# Patient Record
Sex: FEMALE | Race: WHITE | Employment: OTHER | URBAN - METROPOLITAN AREA
[De-identification: names, ages, dates, MRNs, and addresses within clinical notes are randomized per-mention and may not be internally consistent; named-entity substitution may affect disease eponyms.]

---

## 2017-02-21 PROCEDURE — 87086 URINE CULTURE/COLONY COUNT: CPT | Performed by: INTERNAL MEDICINE

## 2017-02-21 PROCEDURE — 87077 CULTURE AEROBIC IDENTIFY: CPT | Performed by: INTERNAL MEDICINE

## 2017-02-21 PROCEDURE — 87186 SC STD MICRODIL/AGAR DIL: CPT | Performed by: INTERNAL MEDICINE

## 2017-02-21 PROCEDURE — 81001 URINALYSIS AUTO W/SCOPE: CPT | Performed by: INTERNAL MEDICINE

## 2017-02-27 PROCEDURE — 87086 URINE CULTURE/COLONY COUNT: CPT | Performed by: INTERNAL MEDICINE

## 2017-02-27 PROCEDURE — 81001 URINALYSIS AUTO W/SCOPE: CPT | Performed by: INTERNAL MEDICINE

## 2017-03-10 PROCEDURE — 84443 ASSAY THYROID STIM HORMONE: CPT | Performed by: INTERNAL MEDICINE

## 2017-03-10 PROCEDURE — 80048 BASIC METABOLIC PNL TOTAL CA: CPT | Performed by: INTERNAL MEDICINE

## 2017-03-10 PROCEDURE — 82306 VITAMIN D 25 HYDROXY: CPT | Performed by: INTERNAL MEDICINE

## 2017-03-10 PROCEDURE — 36415 COLL VENOUS BLD VENIPUNCTURE: CPT | Performed by: INTERNAL MEDICINE

## 2017-04-13 ENCOUNTER — HOSPITAL ENCOUNTER (EMERGENCY)
Age: 67
Discharge: HOME | End: 2017-04-13
Attending: EMERGENCY MEDICINE
Payer: MEDICARE

## 2017-04-13 ENCOUNTER — APPOINTMENT (OUTPATIENT)
Dept: CT IMAGING | Age: 67
End: 2017-04-13
Attending: EMERGENCY MEDICINE
Payer: MEDICARE

## 2017-04-13 VITALS
HEART RATE: 70 BPM | HEIGHT: 64 IN | BODY MASS INDEX: 27.31 KG/M2 | SYSTOLIC BLOOD PRESSURE: 158 MMHG | RESPIRATION RATE: 16 BRPM | TEMPERATURE: 98 F | WEIGHT: 160 LBS | OXYGEN SATURATION: 98 % | DIASTOLIC BLOOD PRESSURE: 67 MMHG

## 2017-04-13 DIAGNOSIS — R42 VERTIGO: Primary | ICD-10-CM

## 2017-04-13 DIAGNOSIS — E86.0 DEHYDRATION: ICD-10-CM

## 2017-04-13 PROCEDURE — 93005 ELECTROCARDIOGRAM TRACING: CPT

## 2017-04-13 PROCEDURE — 70450 CT HEAD/BRAIN W/O DYE: CPT

## 2017-04-13 PROCEDURE — 84484 ASSAY OF TROPONIN QUANT: CPT | Performed by: EMERGENCY MEDICINE

## 2017-04-13 PROCEDURE — 96374 THER/PROPH/DIAG INJ IV PUSH: CPT

## 2017-04-13 PROCEDURE — 84132 ASSAY OF SERUM POTASSIUM: CPT | Performed by: EMERGENCY MEDICINE

## 2017-04-13 PROCEDURE — 85025 COMPLETE CBC W/AUTO DIFF WBC: CPT | Performed by: EMERGENCY MEDICINE

## 2017-04-13 PROCEDURE — 99285 EMERGENCY DEPT VISIT HI MDM: CPT

## 2017-04-13 PROCEDURE — 84460 ALANINE AMINO (ALT) (SGPT): CPT | Performed by: EMERGENCY MEDICINE

## 2017-04-13 PROCEDURE — 84450 TRANSFERASE (AST) (SGOT): CPT | Performed by: EMERGENCY MEDICINE

## 2017-04-13 PROCEDURE — 93010 ELECTROCARDIOGRAM REPORT: CPT

## 2017-04-13 PROCEDURE — 80053 COMPREHEN METABOLIC PANEL: CPT | Performed by: EMERGENCY MEDICINE

## 2017-04-13 PROCEDURE — 96361 HYDRATE IV INFUSION ADD-ON: CPT

## 2017-04-13 RX ORDER — ONDANSETRON 2 MG/ML
4 INJECTION INTRAMUSCULAR; INTRAVENOUS ONCE
Status: COMPLETED | OUTPATIENT
Start: 2017-04-13 | End: 2017-04-13

## 2017-04-13 RX ORDER — MECLIZINE HYDROCHLORIDE 25 MG/1
25 TABLET ORAL 3 TIMES DAILY PRN
Qty: 20 TABLET | Refills: 0 | Status: SHIPPED | OUTPATIENT
Start: 2017-04-13 | End: 2017-09-18 | Stop reason: ALTCHOICE

## 2017-04-13 RX ORDER — POTASSIUM CHLORIDE 20 MEQ/1
20 TABLET, EXTENDED RELEASE ORAL ONCE
Status: COMPLETED | OUTPATIENT
Start: 2017-04-13 | End: 2017-04-13

## 2017-04-13 RX ORDER — MECLIZINE HYDROCHLORIDE 25 MG/1
25 TABLET ORAL ONCE
Status: COMPLETED | OUTPATIENT
Start: 2017-04-13 | End: 2017-04-13

## 2017-04-13 RX ORDER — ONDANSETRON 4 MG/1
4 TABLET, ORALLY DISINTEGRATING ORAL EVERY 4 HOURS PRN
Qty: 20 TABLET | Refills: 0 | Status: SHIPPED | OUTPATIENT
Start: 2017-04-13 | End: 2017-09-18 | Stop reason: ALTCHOICE

## 2017-04-14 NOTE — ED INITIAL ASSESSMENT (HPI)
Pt c/o nausea for one week and dizziness. Pt has appt with GI march 28th , c/o chronic abd pain with blood in her stool. pmd dx patient with hemmorhoids. Pt is scheduled for colonscopy. Pt denies fevers. C/o chills and hot and cold.
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
## 2017-04-14 NOTE — ED PROVIDER NOTES
Patient Seen in: THE [facility] Emergency Department In [place]    History   Patient presents with:  Nausea/Vomiting/Diarrhea (gastrointestinal)  Dizziness (neurologic)    Stated Complaint: nausea, dizzy    HPI    This is a 43-year-old female who arrives here (1 mg total) by mouth 2 (two) times daily. ATORVASTATIN CALCIUM 20 MG Oral Tab,  TAKE 1 TABLET BY MOUTH ONE TIME A DAY    celecoxib 200 MG Oral Cap,  One tab per day . Take after meals.    Fluticasone Propionate (FLONASE) 50 MCG/ACT Nasal Suspension,  Two above.    PSFH elements reviewed from today and agreed except as otherwise stated in HPI.     Physical Exam       ED Triage Vitals   BP 04/13/17 1936 167/87 mmHg   Pulse 04/13/17 1936 73   Resp 04/13/17 1936 18   Temp 04/13/17 1936 98.1 °F (36.7 °C)   Temp POTASSIUM - Abnormal; Notable for the following:     Potassium 3.5 (*)     All other components within normal limits   CBC W/ DIFFERENTIAL - Abnormal; Notable for the following:     Monocyte Absolute 0.93 (*)     All other components within normal limits MRI and further imaging studies but she seem to gotten better with the fluids, meclizine she wants to go home.   S she states that she has no blood in her stools now she is states is all brown stool she has no abdominal pain nausea vomiting at this present

## 2017-07-08 ENCOUNTER — APPOINTMENT (OUTPATIENT)
Dept: GENERAL RADIOLOGY | Age: 67
End: 2017-07-08
Attending: EMERGENCY MEDICINE
Payer: COMMERCIAL

## 2017-07-08 ENCOUNTER — HOSPITAL ENCOUNTER (EMERGENCY)
Age: 67
Discharge: HOME | End: 2017-07-08
Attending: EMERGENCY MEDICINE
Payer: COMMERCIAL

## 2017-07-08 VITALS
WEIGHT: 157 LBS | SYSTOLIC BLOOD PRESSURE: 145 MMHG | DIASTOLIC BLOOD PRESSURE: 78 MMHG | BODY MASS INDEX: 26.8 KG/M2 | OXYGEN SATURATION: 95 % | TEMPERATURE: 99 F | RESPIRATION RATE: 20 BRPM | HEIGHT: 64 IN | HEART RATE: 79 BPM

## 2017-07-08 DIAGNOSIS — S13.4XXA WHIPLASH, INITIAL ENCOUNTER: ICD-10-CM

## 2017-07-08 DIAGNOSIS — M62.838 TRAPEZIUS MUSCLE SPASM: ICD-10-CM
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
## 2017-07-08 PROCEDURE — 72040 X-RAY EXAM NECK SPINE 2-3 VW: CPT | Performed by: EMERGENCY MEDICINE

## 2017-07-08 PROCEDURE — 99284 EMERGENCY DEPT VISIT MOD MDM: CPT

## 2017-07-08 PROCEDURE — 73030 X-RAY EXAM OF SHOULDER: CPT | Performed by: EMERGENCY MEDICINE

## 2017-07-08 RX ORDER — IBUPROFEN 600 MG/1
600 TABLET ORAL ONCE
Status: COMPLETED | OUTPATIENT
Start: 2017-07-08 | End: 2017-07-08

## 2017-07-08 RX ORDER — CYCLOBENZAPRINE HCL 10 MG
10 TABLET ORAL 3 TIMES DAILY PRN
Qty: 20 TABLET | Refills: 0 | Status: SHIPPED | OUTPATIENT
Start: 2017-07-08 | End: 2017-07-15
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
## 2017-08-07 ENCOUNTER — CHARTING TRANS (OUTPATIENT)
Dept: OTHER | Age: 67
End: 2017-08-07

## 2018-01-10 PROBLEM — M54.50 CHRONIC BILATERAL LOW BACK PAIN WITHOUT SCIATICA: Status: ACTIVE | Noted: 2018-01-10

## 2018-01-10 PROBLEM — M54.6 CHRONIC BILATERAL THORACIC BACK PAIN: Status: ACTIVE | Noted: 2018-01-10

## 2018-01-10 PROBLEM — G89.29 CHRONIC BILATERAL LOW BACK PAIN WITHOUT SCIATICA: Status: ACTIVE | Noted: 2018-01-10

## 2018-01-10 PROBLEM — G89.29 CHRONIC BILATERAL THORACIC BACK PAIN: Status: ACTIVE | Noted: 2018-01-10

## 2018-01-12 ENCOUNTER — CHARTING TRANS (OUTPATIENT)
Dept: OTHER | Age: 68
End: 2018-01-12

## 2018-01-12 ENCOUNTER — LAB SERVICES (OUTPATIENT)
Dept: OTHER | Age: 68
End: 2018-01-12

## 2018-01-12 LAB
APPEARANCE: CLEAR
BILIRUBIN: NORMAL
COLOR: YELLOW
GLUCOSE U: NORMAL
KETONES: NORMAL
LEUKOCYTES: NORMAL
NITRITE: NORMAL
OCCULT BLOOD: NORMAL
PH: 5
PROTEIN: NORMAL
URINE SPEC GRAVITY: 1.02
UROBILINOGEN: 0.2

## 2018-01-17 LAB — BACTERIA UR CULT: NORMAL

## 2018-01-18 PROCEDURE — 81003 URINALYSIS AUTO W/O SCOPE: CPT | Performed by: INTERNAL MEDICINE

## 2018-01-23 PROBLEM — I70.0 AORTO-ILIAC ATHEROSCLEROSIS (HCC): Status: ACTIVE | Noted: 2018-01-23

## 2018-01-23 PROBLEM — I70.8 AORTO-ILIAC ATHEROSCLEROSIS (HCC): Status: ACTIVE | Noted: 2018-01-23

## 2018-02-21 PROCEDURE — 81001 URINALYSIS AUTO W/SCOPE: CPT | Performed by: INTERNAL MEDICINE

## 2018-03-02 PROBLEM — M47.816 LUMBAR FACET ARTHROPATHY: Status: ACTIVE | Noted: 2018-03-02

## 2018-07-09 PROCEDURE — 87086 URINE CULTURE/COLONY COUNT: CPT | Performed by: INTERNAL MEDICINE

## 2018-07-09 PROCEDURE — 87077 CULTURE AEROBIC IDENTIFY: CPT | Performed by: INTERNAL MEDICINE

## 2018-07-09 PROCEDURE — 87186 SC STD MICRODIL/AGAR DIL: CPT | Performed by: INTERNAL MEDICINE

## 2018-10-22 PROCEDURE — 87086 URINE CULTURE/COLONY COUNT: CPT | Performed by: INTERNAL MEDICINE

## 2018-11-02 VITALS
RESPIRATION RATE: 20 BRPM | TEMPERATURE: 98.6 F | WEIGHT: 161 LBS | DIASTOLIC BLOOD PRESSURE: 90 MMHG | HEART RATE: 88 BPM | SYSTOLIC BLOOD PRESSURE: 122 MMHG | HEIGHT: 64 IN | BODY MASS INDEX: 27.49 KG/M2

## 2018-11-03 VITALS
HEIGHT: 64 IN | WEIGHT: 156 LBS | RESPIRATION RATE: 16 BRPM | BODY MASS INDEX: 26.63 KG/M2 | HEART RATE: 72 BPM | TEMPERATURE: 98.2 F

## 2019-03-04 PROBLEM — M79.7 FIBROMYALGIA: Status: ACTIVE | Noted: 2019-03-04

## 2019-03-06 PROBLEM — M79.10 MYALGIA: Status: ACTIVE | Noted: 2019-03-06

## 2019-11-19 PROBLEM — IMO0001 ATROPHY, CORTICAL: Status: ACTIVE | Noted: 2019-11-19

## 2020-03-23 PROBLEM — IMO0001 ATROPHY, CORTICAL: Status: RESOLVED | Noted: 2019-11-19 | Resolved: 2020-03-23

## 2020-03-23 PROBLEM — N18.30 CKD (CHRONIC KIDNEY DISEASE) STAGE 3, GFR 30-59 ML/MIN (HCC): Status: ACTIVE | Noted: 2020-03-23

## 2020-03-23 PROBLEM — M79.10 MYALGIA: Status: RESOLVED | Noted: 2019-03-06 | Resolved: 2020-03-23

## 2020-04-20 PROBLEM — D84.9 IMMUNOSUPPRESSED STATUS (HCC): Status: ACTIVE | Noted: 2020-04-20

## 2020-12-29 PROCEDURE — 88304 TISSUE EXAM BY PATHOLOGIST: CPT | Performed by: OPHTHALMOLOGY

## 2021-03-04 ENCOUNTER — APPOINTMENT (OUTPATIENT)
Dept: CT IMAGING | Facility: HOSPITAL | Age: 71
DRG: 177 | End: 2021-03-04
Attending: EMERGENCY MEDICINE
Payer: MEDICARE

## 2021-03-04 ENCOUNTER — APPOINTMENT (OUTPATIENT)
Dept: GENERAL RADIOLOGY | Facility: HOSPITAL | Age: 71
DRG: 177 | End: 2021-03-04
Attending: EMERGENCY MEDICINE
Payer: MEDICARE

## 2021-03-04 ENCOUNTER — HOSPITAL ENCOUNTER (INPATIENT)
Facility: HOSPITAL | Age: 71
LOS: 4 days | Discharge: HOME | DRG: 177 | End: 2021-03-08
Attending: EMERGENCY MEDICINE | Admitting: HOSPITALIST
Payer: MEDICARE

## 2021-03-04 DIAGNOSIS — R09.02 HYPOXIA: ICD-10-CM

## 2021-03-04 DIAGNOSIS — U07.1 COVID-19: Primary | ICD-10-CM

## 2021-03-04 LAB
ALBUMIN SERPL-MCNC: 3.4 G/DL (ref 3.4–5)
ALBUMIN/GLOB SERPL: 0.9 {RATIO} (ref 1–2)
ALP LIVER SERPL-CCNC: 81 U/L
ALT SERPL-CCNC: 19 U/L
ANION GAP SERPL CALC-SCNC: 5 MMOL/L (ref 0–18)
ANTIBODY SCREEN: NEGATIVE
AST SERPL-CCNC: 51 U/L (ref 15–37)
ATRIAL RATE: 50 BPM
BASOPHILS # BLD AUTO: 0.01 X10(3) UL (ref 0–0.2)
BASOPHILS NFR BLD AUTO: 0.2 %
BILIRUB SERPL-MCNC: 0.6 MG/DL (ref 0.1–2)
BUN BLD-MCNC: 30 MG/DL (ref 7–18)
BUN/CREAT SERPL: 25.2 (ref 10–20)
CALCIUM BLD-MCNC: 9.8 MG/DL (ref 8.5–10.1)
CHLORIDE SERPL-SCNC: 102 MMOL/L (ref 98–112)
CK SERPL-CCNC: 94 U/L
CO2 SERPL-SCNC: 27 MMOL/L (ref 21–32)
CREAT BLD-MCNC: 1.19 MG/DL
CRP SERPL-MCNC: 1.62 MG/DL (ref ?–0.3)
D-DIMER: 2.47 UG/ML FEU (ref ?–0.72)
DEPRECATED HBV CORE AB SER IA-ACNC: 472.3 NG/ML
DEPRECATED RDW RBC AUTO: 45.1 FL (ref 35.1–46.3)
EOSINOPHIL # BLD AUTO: 0 X10(3) UL (ref 0–0.7)
EOSINOPHIL NFR BLD AUTO: 0 %
ERYTHROCYTE [DISTWIDTH] IN BLOOD BY AUTOMATED COUNT: 13.3 % (ref 11–15)
GLOBULIN PLAS-MCNC: 4 G/DL (ref 2.8–4.4)
GLUCOSE BLD-MCNC: 99 MG/DL (ref 70–99)
HCT VFR BLD AUTO: 44.8 %
HGB BLD-MCNC: 15 G/DL
IMM GRANULOCYTES # BLD AUTO: 0.03 X10(3) UL (ref 0–1)
IMM GRANULOCYTES NFR BLD: 0.6 %
LDH SERPL L TO P-CCNC: 530 U/L
LYMPHOCYTES # BLD AUTO: 0.83 X10(3) UL (ref 1–4)
LYMPHOCYTES NFR BLD AUTO: 17.4 %
M PROTEIN MFR SERPL ELPH: 7.4 G/DL (ref 6.4–8.2)
MCH RBC QN AUTO: 31.1 PG (ref 26–34)
MCHC RBC AUTO-ENTMCNC: 33.5 G/DL (ref 31–37)
MCV RBC AUTO: 92.9 FL
MONOCYTES # BLD AUTO: 0.66 X10(3) UL (ref 0.1–1)
MONOCYTES NFR BLD AUTO: 13.9 %
NEUTROPHILS # BLD AUTO: 3.23 X10 (3) UL (ref 1.5–7.7)
NEUTROPHILS # BLD AUTO: 3.23 X10(3) UL (ref 1.5–7.7)
NEUTROPHILS NFR BLD AUTO: 67.9 %
NT-PROBNP SERPL-MCNC: 43 PG/ML (ref ?–125)
OSMOLALITY SERPL CALC.SUM OF ELEC: 284 MOSM/KG (ref 275–295)
P AXIS: 41 DEGREES
P-R INTERVAL: 174 MS
PLATELET # BLD AUTO: 109 10(3)UL (ref 150–450)
POTASSIUM SERPL-SCNC: 5 MMOL/L (ref 3.5–5.1)
PROCALCITONIN SERPL-MCNC: <0.05 NG/ML (ref ?–0.16)
Q-T INTERVAL: 442 MS
QRS DURATION: 92 MS
QTC CALCULATION (BEZET): 402 MS
R AXIS: -12 DEGREES
RBC # BLD AUTO: 4.82 X10(6)UL
RH BLOOD TYPE: POSITIVE
SARS-COV-2 RNA RESP QL NAA+PROBE: DETECTED
SODIUM SERPL-SCNC: 134 MMOL/L (ref 136–145)
T AXIS: 36 DEGREES
TROPONIN I SERPL-MCNC: <0.045 NG/ML (ref ?–0.04)
VENTRICULAR RATE: 50 BPM
WBC # BLD AUTO: 4.8 X10(3) UL (ref 4–11)

## 2021-03-04 PROCEDURE — 86140 C-REACTIVE PROTEIN: CPT | Performed by: EMERGENCY MEDICINE

## 2021-03-04 PROCEDURE — 80053 COMPREHEN METABOLIC PANEL: CPT | Performed by: EMERGENCY MEDICINE

## 2021-03-04 PROCEDURE — 3E0333Z INTRODUCTION OF ANTI-INFLAMMATORY INTO PERIPHERAL VEIN, PERCUTANEOUS APPROACH: ICD-10-PCS | Performed by: INTERNAL MEDICINE

## 2021-03-04 PROCEDURE — 83615 LACTATE (LD) (LDH) ENZYME: CPT | Performed by: EMERGENCY MEDICINE

## 2021-03-04 PROCEDURE — 36415 COLL VENOUS BLD VENIPUNCTURE: CPT

## 2021-03-04 PROCEDURE — 87040 BLOOD CULTURE FOR BACTERIA: CPT | Performed by: EMERGENCY MEDICINE

## 2021-03-04 PROCEDURE — 85025 COMPLETE CBC W/AUTO DIFF WBC: CPT | Performed by: EMERGENCY MEDICINE

## 2021-03-04 PROCEDURE — 86850 RBC ANTIBODY SCREEN: CPT | Performed by: INTERNAL MEDICINE

## 2021-03-04 PROCEDURE — 99285 EMERGENCY DEPT VISIT HI MDM: CPT

## 2021-03-04 PROCEDURE — 71275 CT ANGIOGRAPHY CHEST: CPT | Performed by: EMERGENCY MEDICINE

## 2021-03-04 PROCEDURE — 85379 FIBRIN DEGRADATION QUANT: CPT | Performed by: EMERGENCY MEDICINE

## 2021-03-04 PROCEDURE — XW033E5 INTRODUCTION OF REMDESIVIR ANTI-INFECTIVE INTO PERIPHERAL VEIN, PERCUTANEOUS APPROACH, NEW TECHNOLOGY GROUP 5: ICD-10-PCS | Performed by: INTERNAL MEDICINE

## 2021-03-04 PROCEDURE — 93010 ELECTROCARDIOGRAM REPORT: CPT

## 2021-03-04 PROCEDURE — 93005 ELECTROCARDIOGRAM TRACING: CPT

## 2021-03-04 PROCEDURE — 84145 PROCALCITONIN (PCT): CPT | Performed by: EMERGENCY MEDICINE

## 2021-03-04 PROCEDURE — 83880 ASSAY OF NATRIURETIC PEPTIDE: CPT | Performed by: EMERGENCY MEDICINE

## 2021-03-04 PROCEDURE — 86900 BLOOD TYPING SEROLOGIC ABO: CPT | Performed by: INTERNAL MEDICINE

## 2021-03-04 PROCEDURE — 82728 ASSAY OF FERRITIN: CPT | Performed by: EMERGENCY MEDICINE

## 2021-03-04 PROCEDURE — 82550 ASSAY OF CK (CPK): CPT | Performed by: EMERGENCY MEDICINE

## 2021-03-04 PROCEDURE — 84484 ASSAY OF TROPONIN QUANT: CPT | Performed by: EMERGENCY MEDICINE

## 2021-03-04 PROCEDURE — 86901 BLOOD TYPING SEROLOGIC RH(D): CPT | Performed by: INTERNAL MEDICINE

## 2021-03-04 PROCEDURE — 96374 THER/PROPH/DIAG INJ IV PUSH: CPT

## 2021-03-04 PROCEDURE — 71045 X-RAY EXAM CHEST 1 VIEW: CPT | Performed by: EMERGENCY MEDICINE

## 2021-03-04 RX ORDER — ASPIRIN 81 MG/1
81 TABLET ORAL DAILY
Status: DISCONTINUED | OUTPATIENT
Start: 2021-03-05 | End: 2021-03-08

## 2021-03-04 RX ORDER — IBUPROFEN 400 MG/1
400 TABLET ORAL EVERY 6 HOURS PRN
Status: DISCONTINUED | OUTPATIENT
Start: 2021-03-04 | End: 2021-03-08

## 2021-03-04 RX ORDER — ACETAMINOPHEN 325 MG/1
650 TABLET ORAL EVERY 6 HOURS PRN
Status: DISCONTINUED | OUTPATIENT
Start: 2021-03-04 | End: 2021-03-08

## 2021-03-04 RX ORDER — IRBESARTAN 150 MG/1
150 TABLET ORAL DAILY
COMMUNITY
End: 2021-04-23

## 2021-03-04 RX ORDER — ONDANSETRON 2 MG/ML
4 INJECTION INTRAMUSCULAR; INTRAVENOUS EVERY 6 HOURS PRN
Status: DISCONTINUED | OUTPATIENT
Start: 2021-03-04 | End: 2021-03-08

## 2021-03-04 RX ORDER — ENOXAPARIN SODIUM 100 MG/ML
40 INJECTION SUBCUTANEOUS DAILY
Status: DISCONTINUED | OUTPATIENT
Start: 2021-03-04 | End: 2021-03-08

## 2021-03-04 RX ORDER — TRAZODONE HYDROCHLORIDE 50 MG/1
100 TABLET ORAL NIGHTLY
COMMUNITY
End: 2021-03-19

## 2021-03-04 RX ORDER — HYDROCHLOROTHIAZIDE 12.5 MG/1
12.5 TABLET ORAL DAILY
COMMUNITY
End: 2021-03-19

## 2021-03-04 RX ORDER — LEUCOVORIN CALCIUM 10 MG/1
10 TABLET ORAL DAILY
COMMUNITY
End: 2021-04-28

## 2021-03-04 RX ORDER — HYDROCHLOROTHIAZIDE 25 MG/1
12.5 TABLET ORAL DAILY
Status: DISCONTINUED | OUTPATIENT
Start: 2021-03-05 | End: 2021-03-08

## 2021-03-04 RX ORDER — ESCITALOPRAM OXALATE 10 MG/1
10 TABLET ORAL DAILY
COMMUNITY
End: 2021-04-16

## 2021-03-04 RX ORDER — ESCITALOPRAM OXALATE 10 MG/1
10 TABLET ORAL DAILY
Status: DISCONTINUED | OUTPATIENT
Start: 2021-03-05 | End: 2021-03-08

## 2021-03-04 RX ORDER — DEXAMETHASONE SODIUM PHOSPHATE 10 MG/ML
6 INJECTION, SOLUTION INTRAMUSCULAR; INTRAVENOUS DAILY
Status: DISCONTINUED | OUTPATIENT
Start: 2021-03-05 | End: 2021-03-06

## 2021-03-04 RX ORDER — ACETAMINOPHEN 500 MG
1000 TABLET ORAL ONCE
Status: COMPLETED | OUTPATIENT
Start: 2021-03-04 | End: 2021-03-04

## 2021-03-04 RX ORDER — DEXAMETHASONE SODIUM PHOSPHATE 10 MG/ML
6 INJECTION, SOLUTION INTRAMUSCULAR; INTRAVENOUS ONCE
Status: COMPLETED | OUTPATIENT
Start: 2021-03-04 | End: 2021-03-04

## 2021-03-04 RX ORDER — FOLIC ACID 1 MG/1
2 TABLET ORAL DAILY
COMMUNITY
End: 2021-03-25

## 2021-03-04 RX ORDER — FOLIC ACID 1 MG/1
2 TABLET ORAL DAILY
Status: DISCONTINUED | OUTPATIENT
Start: 2021-03-05 | End: 2021-03-08

## 2021-03-04 RX ORDER — LOSARTAN POTASSIUM 50 MG/1
50 TABLET ORAL DAILY
Status: DISCONTINUED | OUTPATIENT
Start: 2021-03-05 | End: 2021-03-08

## 2021-03-04 RX ORDER — ZINC SULFATE 50(220)MG
220 CAPSULE ORAL DAILY
Status: DISCONTINUED | OUTPATIENT
Start: 2021-03-05 | End: 2021-03-08

## 2021-03-04 RX ORDER — ATORVASTATIN CALCIUM 20 MG/1
20 TABLET, FILM COATED ORAL NIGHTLY
Status: DISCONTINUED | OUTPATIENT
Start: 2021-03-04 | End: 2021-03-08

## 2021-03-04 RX ORDER — IBUPROFEN 800 MG/1
800 TABLET ORAL DAILY PRN
COMMUNITY

## 2021-03-04 RX ORDER — TRAZODONE HYDROCHLORIDE 50 MG/1
100 TABLET ORAL NIGHTLY
Status: DISCONTINUED | OUTPATIENT
Start: 2021-03-04 | End: 2021-03-08

## 2021-03-04 RX ORDER — ATORVASTATIN CALCIUM 20 MG/1
20 TABLET, FILM COATED ORAL NIGHTLY
COMMUNITY
End: 2021-06-07

## 2021-03-04 RX ORDER — FLUTICASONE PROPIONATE 50 MCG
2 SPRAY, SUSPENSION (ML) NASAL DAILY
Status: DISCONTINUED | OUTPATIENT
Start: 2021-03-04 | End: 2021-03-08

## 2021-03-04 NOTE — ED PROVIDER NOTES
Patient Seen in: [facility] Emergency Department      History   Patient presents with:  Fever  Nausea/Vomiting/Diarrhea    Stated Complaint: Fatigue, fever, N/V/D for 5 days.  COVID testing done yesterday and results are *    HPI/Subjective:   HPI Triage Vitals   BP 03/04/21 1017 147/78   Pulse 03/04/21 1017 64   Resp 03/04/21 1017 18   Temp 03/04/21 1017 99.5 °F (37.5 °C)   Temp src 03/04/21 1017 Oral   SpO2 03/04/21 1017 91 %   O2 Device 03/04/21 1015 None (Room air)       Current:/74   Puls the following components:    .0 (*)     Lymphocyte Absolute 0.83 (*)     All other components within normal limits   PRO BETA NATRIURETIC PEPTIDE - Normal   PROCALCITONIN - Normal    Narrative:     Resulted by: batch: PBNP, CRP, FERR,           Resu MD Arnol on 3/04/2021 at 11:28 AM       COVID-19 detected. Patient required 2 L oxygen via nasal cannula for O2 sat in the upper 80s while resting on gurney. Did not appear significantly tachypneic.   Admission disposition: 3/4/2021 12:04 PM

## 2021-03-04 NOTE — CONSULTS
INFECTIOUS DISEASE CONSULT NOTE    Corrine Desanctis Patient Status:  Inpatient    3/2/1950 MRN [id number]   [facility] 5NW-A Attending Shobha Luu DO   Hosp Day # 0 PCP No primary care provider on file.        Reason for Consultatio Negative for eye drainage and redness. Ears, nose, mouth, throat: Negative for ear pain, oral ulcers  Respiratory: as above  Cardiovascular: Negative for syncope, lower extremity edema.   Gastrointestinal: Negative for dysphagia, abdominal pain  : Tanner Lucas PBNP 43       Creatinine Kinase  Recent Labs   Lab 03/04/21  1024   CK 94       Inflammatory Markers  Recent Labs   Lab 03/04/21  1024   CRP 1.62*   LUIS ANTONIO 472.3*   *   DDIMER 2.47*       Microbiology    Reviewed in EMR,    Radiology: Ct Angiography, with a peripheral predominance and more pronounced on the right relative to the left. The overall degree of opacity appears mild to moderate. The appearance is suspicious for potential COVID-19 pneumonia.    Dictated by (CST): Vasu Mazariegos DO on 3/04/202 monitor    D/w pt over the phone    Thank you for allowing me to participate in the care of this patient. Please do not hesitate to call if you have any questions.    I will continue to follow with you and will make further recommendations based on her prog

## 2021-03-04 NOTE — ED NOTES
Pt sitting upright on cart, denies new complaints. O2 remains improved on 2L, denies FLORENTINO. Awaiting CT prior to admission.

## 2021-03-04 NOTE — PROGRESS NOTES
Pt is a 65 y/o female admitted with fever,chills and acute resp failure due to covid 19 infection. alert oriented. came to the floor at [phone] from ER.pt is on 2L 02 via NC.02 sats>93%. up as tolerated. encouraged IS and self proning. pending CCP. Inflamatory heather

## 2021-03-05 LAB
ANION GAP SERPL CALC-SCNC: 5 MMOL/L (ref 0–18)
BASOPHILS # BLD AUTO: 0 X10(3) UL (ref 0–0.2)
BASOPHILS NFR BLD AUTO: 0 %
BUN BLD-MCNC: 30 MG/DL (ref 7–18)
BUN/CREAT SERPL: 29.4 (ref 10–20)
CALCIUM BLD-MCNC: 9.1 MG/DL (ref 8.5–10.1)
CHLORIDE SERPL-SCNC: 108 MMOL/L (ref 98–112)
CO2 SERPL-SCNC: 27 MMOL/L (ref 21–32)
CREAT BLD-MCNC: 1.02 MG/DL
CRP SERPL-MCNC: 1.94 MG/DL (ref ?–0.3)
D-DIMER: 1.64 UG/ML FEU (ref ?–0.72)
DEPRECATED HBV CORE AB SER IA-ACNC: 446.1 NG/ML
DEPRECATED RDW RBC AUTO: 45.3 FL (ref 35.1–46.3)
EOSINOPHIL # BLD AUTO: 0 X10(3) UL (ref 0–0.7)
EOSINOPHIL NFR BLD AUTO: 0 %
ERYTHROCYTE [DISTWIDTH] IN BLOOD BY AUTOMATED COUNT: 13.3 % (ref 11–15)
GLUCOSE BLD-MCNC: 101 MG/DL (ref 70–99)
HAV IGM SER QL: 2.2 MG/DL (ref 1.6–2.6)
HCT VFR BLD AUTO: 43.1 %
HGB BLD-MCNC: 14.3 G/DL
IMM GRANULOCYTES # BLD AUTO: 0.02 X10(3) UL (ref 0–1)
IMM GRANULOCYTES NFR BLD: 0.8 %
LYMPHOCYTES # BLD AUTO: 0.75 X10(3) UL (ref 1–4)
LYMPHOCYTES NFR BLD AUTO: 29.3 %
MCH RBC QN AUTO: 31.1 PG (ref 26–34)
MCHC RBC AUTO-ENTMCNC: 33.2 G/DL (ref 31–37)
MCV RBC AUTO: 93.7 FL
MONOCYTES # BLD AUTO: 0.47 X10(3) UL (ref 0.1–1)
MONOCYTES NFR BLD AUTO: 18.4 %
NEUTROPHILS # BLD AUTO: 1.32 X10 (3) UL (ref 1.5–7.7)
NEUTROPHILS # BLD AUTO: 1.32 X10(3) UL (ref 1.5–7.7)
NEUTROPHILS NFR BLD AUTO: 51.5 %
OSMOLALITY SERPL CALC.SUM OF ELEC: 296 MOSM/KG (ref 275–295)
PLATELET # BLD AUTO: 89 10(3)UL (ref 150–450)
POTASSIUM SERPL-SCNC: 4 MMOL/L (ref 3.5–5.1)
RBC # BLD AUTO: 4.6 X10(6)UL
SODIUM SERPL-SCNC: 140 MMOL/L (ref 136–145)
WBC # BLD AUTO: 2.6 X10(3) UL (ref 4–11)

## 2021-03-05 PROCEDURE — 36430 TRANSFUSION BLD/BLD COMPNT: CPT

## 2021-03-05 PROCEDURE — 80048 BASIC METABOLIC PNL TOTAL CA: CPT | Performed by: INTERNAL MEDICINE

## 2021-03-05 PROCEDURE — 86140 C-REACTIVE PROTEIN: CPT | Performed by: INTERNAL MEDICINE

## 2021-03-05 PROCEDURE — XW13325 TRANSFUSION OF CONVALESCENT PLASMA (NONAUTOLOGOUS) INTO PERIPHERAL VEIN, PERCUTANEOUS APPROACH, NEW TECHNOLOGY GROUP 5: ICD-10-PCS | Performed by: INTERNAL MEDICINE

## 2021-03-05 PROCEDURE — 83735 ASSAY OF MAGNESIUM: CPT | Performed by: INTERNAL MEDICINE

## 2021-03-05 PROCEDURE — 86927 PLASMA FRESH FROZEN: CPT

## 2021-03-05 PROCEDURE — 85025 COMPLETE CBC W/AUTO DIFF WBC: CPT | Performed by: INTERNAL MEDICINE

## 2021-03-05 PROCEDURE — 85379 FIBRIN DEGRADATION QUANT: CPT | Performed by: INTERNAL MEDICINE

## 2021-03-05 PROCEDURE — 82728 ASSAY OF FERRITIN: CPT | Performed by: INTERNAL MEDICINE

## 2021-03-05 NOTE — PROGRESS NOTES
RESUMED CARE FOR PT IN , REPORT RECEIVED FROM STEVE TELLEZ. PT CURRENTLY WITH PLASMA TRANSFUSION. WILL CONTINUE TO MONITOR.

## 2021-03-05 NOTE — PROGRESS NOTES
INFECTIOUS DISEASE TELEVISIT PROGRESS NOTE    Taiwo Grimaldo Patient Status:  Inpatient    3/2/1950 MRN [id number]   [facility] 5NW-A Attending Richard Coello,    Hosp Day # 1 PCP No primary care provider on file.      Remdesivir d# 94 %, not currently breastfeeding.   Pt not examined as trying to preserve PPE and limit exposure/ transmission      Laboratory Data:    Recent Labs   Lab 03/04/21  1024 03/05/21  0642   RBC 4.82 4.60   HGB 15.0 14.3   HCT 44.8 43.1   MCV 92.9 93.7   MCH 31 3D volume renderings are generated. Dose reduction techniques were used. Dose information is transmitted to the [facility] [employer] of Radiology) [facility] ([address]) which includes the Dose Index Registry.   PATIENT STATED HISTORY:(As yesterday and results are not back  PATIENT STATED HISTORY: (As transcribed by Technologist)  Patient states she has been having dry cough, fever, poor appetite, and generalized fatigue for 5-7 days. FINDINGS:  Cardiac size within normal limits.   Mild i

## 2021-03-05 NOTE — PROGRESS NOTES
PT COMPLETED TRANSFUSION OF 1 UNIT CONVALESCENT PLASMA WITHOUT SYMPTOMS OF COMPLICATIONS. VS STABLE. WILL CONTINUE TO MONITOR.

## 2021-03-05 NOTE — PROGRESS NOTES
Pt is a 65 y/o female admitted with fever,chills and acute resp failure due to covid 19 infection. alert oriented. recent travel to [facility]. pt is on 3L 02 via NC.02 sats>93%. up as tolerated. encouraged IS and self proning. pending CCP. Inflamatory markers eleva

## 2021-03-05 NOTE — PROGRESS NOTES
COVID-19 Daily Discharge Readiness-Nursing    O2 Sat at Rest:  93% on 2L - dropped to 86% while asleep (encouraged to side prone)  Temperature max from last 24 hrs: Temp (24hrs), Av.1 °F (36.7 °C), Min:97.3 °F (36.3 °C), Max:99.5 °F (37.5 °C)    Inflam

## 2021-03-06 LAB
ALBUMIN SERPL-MCNC: 2.8 G/DL (ref 3.4–5)
ALBUMIN/GLOB SERPL: 0.9 {RATIO} (ref 1–2)
ALP LIVER SERPL-CCNC: 65 U/L
ALT SERPL-CCNC: 16 U/L
ANION GAP SERPL CALC-SCNC: 5 MMOL/L (ref 0–18)
AST SERPL-CCNC: 17 U/L (ref 15–37)
BILIRUB SERPL-MCNC: 0.4 MG/DL (ref 0.1–2)
BILIRUB UR QL STRIP.AUTO: NEGATIVE
BLOOD TYPE BARCODE: 6200
BUN BLD-MCNC: 35 MG/DL (ref 7–18)
BUN/CREAT SERPL: 38 (ref 10–20)
CALCIUM BLD-MCNC: 9.2 MG/DL (ref 8.5–10.1)
CHLORIDE SERPL-SCNC: 107 MMOL/L (ref 98–112)
CO2 SERPL-SCNC: 28 MMOL/L (ref 21–32)
COLOR UR AUTO: YELLOW
CREAT BLD-MCNC: 0.92 MG/DL
GLOBULIN PLAS-MCNC: 3.2 G/DL (ref 2.8–4.4)
GLUCOSE BLD-MCNC: 104 MG/DL (ref 70–99)
GLUCOSE UR STRIP.AUTO-MCNC: NEGATIVE MG/DL
HYALINE CASTS #/AREA URNS AUTO: PRESENT /LPF
KETONES UR STRIP.AUTO-MCNC: NEGATIVE MG/DL
LEUKOCYTE ESTERASE UR QL STRIP.AUTO: NEGATIVE
M PROTEIN MFR SERPL ELPH: 6 G/DL (ref 6.4–8.2)
NITRITE UR QL STRIP.AUTO: POSITIVE
OSMOLALITY SERPL CALC.SUM OF ELEC: 298 MOSM/KG (ref 275–295)
PH UR STRIP.AUTO: 5 [PH] (ref 5–8)
POTASSIUM SERPL-SCNC: 3.9 MMOL/L (ref 3.5–5.1)
PROT UR STRIP.AUTO-MCNC: NEGATIVE MG/DL
RBC UR QL AUTO: NEGATIVE
SODIUM SERPL-SCNC: 140 MMOL/L (ref 136–145)
SP GR UR STRIP.AUTO: 1.02 (ref 1–1.03)
UROBILINOGEN UR STRIP.AUTO-MCNC: <2 MG/DL

## 2021-03-06 PROCEDURE — 81001 URINALYSIS AUTO W/SCOPE: CPT | Performed by: EMERGENCY MEDICINE

## 2021-03-06 PROCEDURE — 80053 COMPREHEN METABOLIC PANEL: CPT

## 2021-03-06 NOTE — COVID NURSING ASSESSMENT
COVID-19 Daily Discharge Readiness-Nursing    O2 Sat at Rest:    94 %  On 1L nasal cannula, 3L with sleep    Temperature max from last 24 hrs: Temp (24hrs), Av.2 °F (36.8 °C), Min:97.4 °F (36.3 °C), Max:99.3 °F (37.4 °C)    Inflammatory Markers:   Rece

## 2021-03-06 NOTE — PLAN OF CARE
Problem: Patient/Family Goals  Goal: Patient/Family Long Term Goal  Description: Patient's Long Term Goal: discharge home with adequate resources    Interventions:  - comply with POC  - See additional Care Plan goals for specific interventions  Outcome: support as indicated  - Manage/alleviate anxiety  - Monitor for signs/symptoms of CO2 retention  Outcome: Progressing   Pt is alert and oriented. O2 is currently WNL on RA. O2 walk in room; Pt is 88% on RA with ambulation needing 1L to recover.   Back on

## 2021-03-06 NOTE — PLAN OF CARE
Problem: Patient/Family Goals  Goal: Patient/Family Long Term Goal  Description: Patient's Long Term Goal: discharge home with adequate resources    Interventions:  - comply with POC  - See additional Care Plan goals for specific interventions  Outcome:

## 2021-03-06 NOTE — PROGRESS NOTES
INFECTIOUS DISEASE TELEVISIT PROGRESS NOTE    Huseyin Barr Patient Status:  Inpatient    3/2/1950 MRN [id number]   [facility] 5NW-A Attending Mackenzie Hunt DO   Hosp Day # 2 PCP No primary care provider on file.      Remdesivir d# breastfeeding.   Pt not examined as trying to preserve PPE and limit exposure/ transmission      Laboratory Data:    Recent Labs   Lab 03/04/21  1024 03/05/21  0642   RBC 4.82 4.60   HGB 15.0 14.3   HCT 44.8 43.1   MCV 92.9 93.7   MCH 31.1 31.1   MCHC 33.5 days. COVID testing done yesterday and results are not back  TECHNIQUE:  IV contrast-enhanced multislice CT angiography is performed through the pulmonary arterial anatomy. 3D volume renderings are generated. Dose reduction techniques were used.  Dose info XR CHEST AP PORTABLE  (CPT=71045)  TECHNIQUE:  AP chest radiograph was obtained. COMPARISON:  None. INDICATIONS:  Fatigue, fever, N/V/D for 5 days.  COVID testing done yesterday and results are not back  PATIENT STATED HISTORY: (As transcribed by [place]

## 2021-03-06 NOTE — PLAN OF CARE
Problem: Patient/Family Goals  Goal: Patient/Family Long Term Goal  Description: Patient's Long Term Goal: DISCHARGE HOME    Interventions:  -WEAN OFF OXYGEN  REMDESIVIR  - See additional Care Plan goals for specific interventions  Outcome: Progressing

## 2021-03-07 LAB
ALBUMIN SERPL-MCNC: 2.7 G/DL (ref 3.4–5)
ALBUMIN/GLOB SERPL: 0.8 {RATIO} (ref 1–2)
ALP LIVER SERPL-CCNC: 65 U/L
ALT SERPL-CCNC: 17 U/L
ANION GAP SERPL CALC-SCNC: 7 MMOL/L (ref 0–18)
AST SERPL-CCNC: 29 U/L (ref 15–37)
BASOPHILS # BLD AUTO: 0 X10(3) UL (ref 0–0.2)
BASOPHILS NFR BLD AUTO: 0 %
BILIRUB SERPL-MCNC: 0.4 MG/DL (ref 0.1–2)
BUN BLD-MCNC: 33 MG/DL (ref 7–18)
BUN/CREAT SERPL: 34.4 (ref 10–20)
CALCIUM BLD-MCNC: 8.9 MG/DL (ref 8.5–10.1)
CHLORIDE SERPL-SCNC: 108 MMOL/L (ref 98–112)
CO2 SERPL-SCNC: 25 MMOL/L (ref 21–32)
CREAT BLD-MCNC: 0.96 MG/DL
CRP SERPL-MCNC: 0.77 MG/DL (ref ?–0.3)
DEPRECATED HBV CORE AB SER IA-ACNC: 423.8 NG/ML
DEPRECATED RDW RBC AUTO: 45.2 FL (ref 35.1–46.3)
EOSINOPHIL # BLD AUTO: 0 X10(3) UL (ref 0–0.7)
EOSINOPHIL NFR BLD AUTO: 0 %
ERYTHROCYTE [DISTWIDTH] IN BLOOD BY AUTOMATED COUNT: 13.4 % (ref 11–15)
GLOBULIN PLAS-MCNC: 3.2 G/DL (ref 2.8–4.4)
GLUCOSE BLD-MCNC: 133 MG/DL (ref 70–99)
HCT VFR BLD AUTO: 40 %
HGB BLD-MCNC: 13.1 G/DL
IMM GRANULOCYTES # BLD AUTO: 0.06 X10(3) UL (ref 0–1)
IMM GRANULOCYTES NFR BLD: 1 %
LDH SERPL L TO P-CCNC: 216 U/L
LYMPHOCYTES # BLD AUTO: 0.99 X10(3) UL (ref 1–4)
LYMPHOCYTES NFR BLD AUTO: 17.2 %
M PROTEIN MFR SERPL ELPH: 5.9 G/DL (ref 6.4–8.2)
MCH RBC QN AUTO: 30.8 PG (ref 26–34)
MCHC RBC AUTO-ENTMCNC: 32.8 G/DL (ref 31–37)
MCV RBC AUTO: 94.1 FL
MONOCYTES # BLD AUTO: 0.87 X10(3) UL (ref 0.1–1)
MONOCYTES NFR BLD AUTO: 15.1 %
NEUTROPHILS # BLD AUTO: 3.85 X10 (3) UL (ref 1.5–7.7)
NEUTROPHILS # BLD AUTO: 3.85 X10(3) UL (ref 1.5–7.7)
NEUTROPHILS NFR BLD AUTO: 66.7 %
OSMOLALITY SERPL CALC.SUM OF ELEC: 299 MOSM/KG (ref 275–295)
PLATELET # BLD AUTO: 145 10(3)UL (ref 150–450)
POTASSIUM SERPL-SCNC: 3.8 MMOL/L (ref 3.5–5.1)
RBC # BLD AUTO: 4.25 X10(6)UL
SODIUM SERPL-SCNC: 140 MMOL/L (ref 136–145)
WBC # BLD AUTO: 5.8 X10(3) UL (ref 4–11)

## 2021-03-07 PROCEDURE — 83615 LACTATE (LD) (LDH) ENZYME: CPT | Performed by: HOSPITALIST

## 2021-03-07 PROCEDURE — 85025 COMPLETE CBC W/AUTO DIFF WBC: CPT | Performed by: HOSPITALIST

## 2021-03-07 PROCEDURE — 82728 ASSAY OF FERRITIN: CPT | Performed by: HOSPITALIST

## 2021-03-07 PROCEDURE — 86140 C-REACTIVE PROTEIN: CPT | Performed by: HOSPITALIST

## 2021-03-07 PROCEDURE — 80053 COMPREHEN METABOLIC PANEL: CPT

## 2021-03-07 RX ORDER — ECHINACEA PURPUREA EXTRACT 125 MG
1 TABLET ORAL
Status: DISCONTINUED | OUTPATIENT
Start: 2021-03-07 | End: 2021-03-08

## 2021-03-07 NOTE — COVID NURSING ASSESSMENT
COVID-19 Daily Discharge Readiness-Nursing    O2 Sat at Rest:  SPO2% on Room Air at Rest: 90  %   O2 Sat with Exertion: 87  % on room air  Temperature max from last 24 hrs: Temp (24hrs), Av °F (36.7 °C), Min:97 °F (36.1 °C), Max:98.8 °F (37.1 °C)    In

## 2021-03-07 NOTE — PROGRESS NOTES
[facility] Hospitalist Progress Note     Derek Florenceurbano Patient Status:  Inpatient    3/2/1950 MRN [id number]   [facility] 5NW-A Attending Xenia Malone MD   Hosp Day # 2 PCP No primary care provider on file.      CC: follow up    SUBJECTIVE: HCl  100 mg Oral Nightly   • zinc sulfate  220 mg Oral Daily   • enoxaparin  40 mg Subcutaneous Daily     Continuous Infusing Medication:  PRN Medication:acetaminophen, ondansetron HCl, ibuprofen       Microbiology:    Hospital Encounter on 03/04/21   1.  B

## 2021-03-07 NOTE — PROGRESS NOTES
[facility] Hospitalist Progress Note     Nimo Khan Patient Status:  Inpatient    3/2/1950 MRN [id number]   [facility] 5NW-A Attending Monica Haile MD   Hosp Day # 2 PCP No primary care provider on file.      CC: follow up    SUBJECTIVE: CULTURE     Status: None (Preliminary result)    Collection Time: 03/04/21 12:29 PM    Specimen: Blood,peripheral   Result Value Ref Range    Blood Culture Result No Growth 2 Days N/A       Lab Results   Component Value Date    COVID19 Detected (A) 03/04/2

## 2021-03-07 NOTE — PROGRESS NOTES
[facility] Hospitalist Progress Note     Lory Snider Patient Status:  Inpatient    3/2/1950 MRN [id number]   [facility] 5NW-A Attending Grisel Ferreira MD   Hosp Day # 3 PCP No primary care provider on file.      CC: follow up    SUBJECTIVE: Fluticasone Propionate  2 spray Nasal Daily   • folic acid  2 mg Oral Daily   • hydrochlorothiazide  12.5 mg Oral Daily   • Losartan Potassium  50 mg Oral Daily   • traZODone HCl  100 mg Oral Nightly   • zinc sulfate  220 mg Oral Daily   • enoxaparin  40 m

## 2021-03-07 NOTE — PLAN OF CARE
Problem: Patient/Family Goals  Goal: Patient/Family Long Term Goal  Description: Patient's Long Term Goal: discharge home with adequate resources    Interventions:  - comply with POC  - See additional Care Plan goals for specific interventions  Outcome: respiratory difficulty  - Respiratory Therapy support as indicated  - Manage/alleviate anxiety  - Monitor for signs/symptoms of CO2 retention  Outcome: Progressing

## 2021-03-07 NOTE — PLAN OF CARE
COVID-19 Daily Discharge Readiness-Nursing    O2 Sat at Rest:  SPO2% on Room Air at Rest: 90  %   O2 Sat with Exertion: SPO2% Ambulation on Oxygen: 92  % on    liters   Temperature max from last 24 hrs: Temp (24hrs), Av.1 °F (36.7 °C), Min:97 °F (36.1 Administer analgesics based on type and severity of pain and evaluate response  - Implement non-pharmacological measures as appropriate and evaluate response  - Consider cultural and social influences on pain and pain management  - Manage/alleviate anxiety

## 2021-03-08 VITALS
WEIGHT: 162.5 LBS | HEART RATE: 56 BPM | BODY MASS INDEX: 27.74 KG/M2 | OXYGEN SATURATION: 92 % | SYSTOLIC BLOOD PRESSURE: 119 MMHG | TEMPERATURE: 99 F | DIASTOLIC BLOOD PRESSURE: 76 MMHG | RESPIRATION RATE: 18 BRPM | HEIGHT: 64 IN

## 2021-03-08 LAB
ALBUMIN SERPL-MCNC: 2.8 G/DL (ref 3.4–5)
ALBUMIN/GLOB SERPL: 0.8 {RATIO} (ref 1–2)
ALP LIVER SERPL-CCNC: 68 U/L
ALT SERPL-CCNC: 23 U/L
ANION GAP SERPL CALC-SCNC: 6 MMOL/L (ref 0–18)
AST SERPL-CCNC: 28 U/L (ref 15–37)
BILIRUB SERPL-MCNC: 0.5 MG/DL (ref 0.1–2)
BUN BLD-MCNC: 29 MG/DL (ref 7–18)
BUN/CREAT SERPL: 38.7 (ref 10–20)
CALCIUM BLD-MCNC: 9.2 MG/DL (ref 8.5–10.1)
CHLORIDE SERPL-SCNC: 106 MMOL/L (ref 98–112)
CO2 SERPL-SCNC: 26 MMOL/L (ref 21–32)
CREAT BLD-MCNC: 0.75 MG/DL
GLOBULIN PLAS-MCNC: 3.3 G/DL (ref 2.8–4.4)
GLUCOSE BLD-MCNC: 91 MG/DL (ref 70–99)
M PROTEIN MFR SERPL ELPH: 6.1 G/DL (ref 6.4–8.2)
OSMOLALITY SERPL CALC.SUM OF ELEC: 291 MOSM/KG (ref 275–295)
POTASSIUM SERPL-SCNC: 3.8 MMOL/L (ref 3.5–5.1)
SODIUM SERPL-SCNC: 138 MMOL/L (ref 136–145)

## 2021-03-08 PROCEDURE — 80053 COMPREHEN METABOLIC PANEL: CPT

## 2021-03-08 NOTE — PLAN OF CARE
COVID-19 Daily Discharge Readiness-Nursing  Ax04. Telemetry sinus esteban at noc. HR 35-40. Asymptomatic. Patient is on room air during the day. She required  1L NC overnight. Desats to 88% on room air , high suspecion of RODNEY. Dry cough. Afebrile.  Denied pa Administer analgesics based on type and severity of pain and evaluate response  - Implement non-pharmacological measures as appropriate and evaluate response  - Consider cultural and social influences on pain and pain management  - Manage/alleviate anxiety

## 2021-03-08 NOTE — PROGRESS NOTES
NURSING DISCHARGE NOTE    Discharged Home via Wheelchair. Accompanied by Support staff  Belongings Taken by patient/family. Pt discharged home. Discharge instructions reviewed with pt. No questions regarding anything.

## 2021-03-08 NOTE — DISCHARGE SUMMARY
General Medicine Discharge Summary     Patient ID:  Narinder Rivera  66year old  3/2/1950    Admit date: 3/4/2021    Discharge date and time: 3/8/2021      Attending Physician: Lindy Montemayor MD MG Oral Tab  Take 10 mg by mouth daily. AFTER METHOTREXATE ONLY. atorvastatin 20 MG Oral Tab  Take 20 mg by mouth nightly. escitalopram 10 MG Oral Tab  Take 10 mg by mouth daily. folic acid 1 MG Oral Tab  Take 2 mg by mouth daily.     Irbesartan 15 I PERSONALLY RECONCILED CURRENT AND DISCHARGE MEDICATIONS ON DAY OF DISCHARGE      Activity: activity as tolerated  Diet: regular diet  Wound Care: as directed  Code Status: No Order      Exam on day of discharge:      03/08/21  1247   BP: 119/76

## 2021-03-08 NOTE — PROGRESS NOTES
INFECTIOUS DISEASE TELEVISIT PROGRESS NOTE    Donny Flores Patient Status:  Inpatient    3/2/1950 MRN [id number]   [facility] 5NW-A Attending Denton Pat DO   Hosp Day # 4 PCP No primary care provider on file.      Remdesivir d# (1.626 m), weight 162 lb 8 oz (73.7 kg), SpO2 94 %, not currently breastfeeding.   Pt not examined as trying to preserve PPE and limit exposure/ transmission      Laboratory Data:    Recent Labs   Lab 03/04/21  1024 03/05/21  0642 03/07/21  0547   RBC 4.82 Angiography, Chest (cpt=71275)    Result Date: 3/4/2021  PROCEDURE:  CT ANGIOGRAPHY, CHEST (CPT=71275)  COMPARISON:  None. INDICATIONS:  Fatigue, fever, N/V/D for 5 days.  COVID testing done yesterday and results are not back  TECHNIQUE:  IV contrast-enhan on 3/04/2021 at 1:39 PM     Finalized by (CST): Pillo Wellington DO on 3/04/2021 at 1:53 PM       Xr Chest Ap Portable  (cpt=71045)    Result Date: 3/4/2021  PROCEDURE:  XR CHEST AP PORTABLE  (CPT=71045)  TECHNIQUE:  AP chest radiograph was obtained.   Jana Coffee

## 2021-03-10 NOTE — PAYOR COMM NOTE
--------------  DISCHARGE REVIEW    Lan Torres [place] [facility]  Subscriber #:  [id number]  Authorization Number: [id number]    Admit date: 3/4/21  Admit time:   2:38 PM  Discharge Date: 3/8/2021  4:08 PM     Admitting Physician: Soumya Epstein MD  Attending Physici - cont decadron   - CCP per ID  - monitor inflammatory markers   - wean O2 as needed  - prone as able      # Essential HTN  - cont irbesartan and HCTZ     # HLD  - cont statin     # RA  # Immunosupressed state  - hold leucovorin and methotrexate for now Cream  Apply bid    Diclofenac Sodium (VOLTAREN) 1 % Transdermal Gel  Apply 2 g topically 2 (two) times daily as needed. Clobetasol Propionate 0.05 % External Solution  Apply to scalp bid prn. Do not use for more than 1 consecutive week.     Fluticasone

## 2021-05-03 PROBLEM — U07.1 COVID-19: Status: RESOLVED | Noted: 2021-03-04 | Resolved: 2021-05-03

## 2021-05-03 PROBLEM — R09.02 HYPOXIA: Status: RESOLVED | Noted: 2021-03-04 | Resolved: 2021-05-03

## 2021-05-03 PROBLEM — N18.30 STAGE 3 CHRONIC KIDNEY DISEASE, UNSPECIFIED WHETHER STAGE 3A OR 3B CKD (HCC): Status: ACTIVE | Noted: 2021-05-03

## 2021-05-03 PROBLEM — D84.821 IMMUNOCOMPROMISED STATE DUE TO DRUG THERAPY (HCC): Status: ACTIVE | Noted: 2021-05-03

## 2021-05-03 PROBLEM — Z79.899 IMMUNOCOMPROMISED STATE DUE TO DRUG THERAPY (HCC): Status: ACTIVE | Noted: 2021-05-03

## 2021-05-03 PROBLEM — N18.30 CKD (CHRONIC KIDNEY DISEASE) STAGE 3, GFR 30-59 ML/MIN (HCC): Status: RESOLVED | Noted: 2020-03-23 | Resolved: 2021-05-03

## 2021-07-04 ENCOUNTER — HOSPITAL ENCOUNTER (EMERGENCY)
Age: 71
Discharge: HOME | End: 2021-07-04
Attending: EMERGENCY MEDICINE
Payer: MEDICARE

## 2021-07-04 VITALS
BODY MASS INDEX: 27.31 KG/M2 | HEART RATE: 70 BPM | TEMPERATURE: 98 F | DIASTOLIC BLOOD PRESSURE: 84 MMHG | HEIGHT: 64 IN | RESPIRATION RATE: 20 BRPM | WEIGHT: 160 LBS | OXYGEN SATURATION: 97 % | SYSTOLIC BLOOD PRESSURE: 160 MMHG

## 2021-07-04 DIAGNOSIS — H10.31 ACUTE CONJUNCTIVITIS OF RIGHT EYE, UNSPECIFIED ACUTE CONJUNCTIVITIS TYPE: Primary | ICD-10-CM

## 2021-07-04 DIAGNOSIS — S05.01XA ABRASION OF RIGHT CORNEA, INITIAL ENCOUNTER: ICD-10-CM

## 2021-07-04 PROCEDURE — 99283 EMERGENCY DEPT VISIT LOW MDM: CPT

## 2021-07-04 RX ORDER — OFLOXACIN 3 MG/ML
2 SOLUTION/ DROPS OPHTHALMIC EVERY 4 HOURS
Qty: 10 ML | Refills: 0 | Status: SHIPPED | OUTPATIENT
Start: 2021-07-04 | End: 2021-11-11 | Stop reason: ALTCHOICE

## 2021-07-04 NOTE — ED PROVIDER NOTES
Patient Seen in: [facility] Emergency Department In [place]      History   Patient presents with: Eye Visual Problem    Stated Complaint: R EYE PAINFUL AND RED    HPI/Subjective:   HPI    Very pleasant 34-year-old female.   Yesterday, the patient had some Current:/84   Pulse 70   Temp 98 °F (36.7 °C)   Resp 20   Ht 162.6 cm (5' 4\")   Wt 72.6 kg   SpO2 97%   BMI 27.46 kg/m²     Right Eye Chart Acuity: 20/100, Corrected  Left Eye Chart Acuity: 20/40, Corrected    Physical Exam    Gen: Well appear

## 2021-11-10 PROBLEM — I12.9 HYPERTENSIVE KIDNEY DISEASE WITH CHRONIC KIDNEY DISEASE: Status: ACTIVE | Noted: 2021-11-10

## 2022-06-09 ENCOUNTER — APPOINTMENT (OUTPATIENT)
Dept: GENERAL RADIOLOGY | Age: 72
End: 2022-06-09
Attending: NURSE PRACTITIONER
Payer: MEDICARE

## 2022-06-09 ENCOUNTER — HOSPITAL ENCOUNTER (EMERGENCY)
Age: 72
Discharge: HOME | End: 2022-06-09
Attending: EMERGENCY MEDICINE
Payer: MEDICARE

## 2022-06-09 VITALS
SYSTOLIC BLOOD PRESSURE: 133 MMHG | HEIGHT: 63 IN | TEMPERATURE: 98 F | WEIGHT: 171 LBS | RESPIRATION RATE: 16 BRPM | BODY MASS INDEX: 30.3 KG/M2 | OXYGEN SATURATION: 97 % | DIASTOLIC BLOOD PRESSURE: 93 MMHG | HEART RATE: 77 BPM

## 2022-06-09 DIAGNOSIS — M54.31 SCIATICA OF RIGHT SIDE: Primary | ICD-10-CM

## 2022-06-09 DIAGNOSIS — M25.551 HIP PAIN, ACUTE, RIGHT: ICD-10-CM

## 2022-06-09 LAB
BILIRUB UR QL STRIP.AUTO: NEGATIVE
CLARITY UR REFRACT.AUTO: CLEAR
COLOR UR AUTO: YELLOW
GLUCOSE UR STRIP.AUTO-MCNC: NEGATIVE MG/DL
KETONES UR STRIP.AUTO-MCNC: NEGATIVE MG/DL
NITRITE UR QL STRIP.AUTO: NEGATIVE
PH UR STRIP.AUTO: 5 [PH] (ref 5–8)
PROT UR STRIP.AUTO-MCNC: NEGATIVE MG/DL
RBC UR QL AUTO: NEGATIVE
SP GR UR STRIP.AUTO: 1.02 (ref 1–1.03)
UROBILINOGEN UR STRIP.AUTO-MCNC: 0.2 MG/DL

## 2022-06-09 PROCEDURE — 81015 MICROSCOPIC EXAM OF URINE: CPT | Performed by: NURSE PRACTITIONER

## 2022-06-09 PROCEDURE — 99284 EMERGENCY DEPT VISIT MOD MDM: CPT

## 2022-06-09 PROCEDURE — 87186 SC STD MICRODIL/AGAR DIL: CPT | Performed by: NURSE PRACTITIONER

## 2022-06-09 PROCEDURE — 73502 X-RAY EXAM HIP UNI 2-3 VIEWS: CPT | Performed by: NURSE PRACTITIONER

## 2022-06-09 PROCEDURE — 87086 URINE CULTURE/COLONY COUNT: CPT | Performed by: NURSE PRACTITIONER

## 2022-06-09 PROCEDURE — 81001 URINALYSIS AUTO W/SCOPE: CPT | Performed by: NURSE PRACTITIONER

## 2022-06-09 PROCEDURE — 87088 URINE BACTERIA CULTURE: CPT | Performed by: NURSE PRACTITIONER

## 2022-06-09 RX ORDER — TRAMADOL HYDROCHLORIDE 50 MG/1
TABLET ORAL EVERY 6 HOURS PRN
Qty: 10 TABLET | Refills: 0 | Status: SHIPPED | OUTPATIENT
Start: 2022-06-09 | End: 2022-06-14

## 2022-06-09 RX ORDER — METHYLPREDNISOLONE 4 MG/1
TABLET ORAL
Qty: 1 EACH | Refills: 0 | Status: SHIPPED | OUTPATIENT
Start: 2022-06-09

## 2022-06-09 RX ORDER — TRAMADOL HYDROCHLORIDE 50 MG/1
50 TABLET ORAL ONCE
Status: COMPLETED | OUTPATIENT
Start: 2022-06-09 | End: 2022-06-09

## 2022-06-12 RX ORDER — CEPHALEXIN 500 MG/1
500 CAPSULE ORAL 2 TIMES DAILY
Qty: 14 CAPSULE | Refills: 0 | Status: SHIPPED | OUTPATIENT
Start: 2022-06-12 | End: 2022-06-19

## 2024-08-22 ENCOUNTER — LAB REQUISITION (OUTPATIENT)
Dept: LAB | Facility: HOSPITAL | Age: 74
End: 2024-08-22
Payer: MEDICARE

## 2024-08-22 DIAGNOSIS — C50.912 MALIGNANT NEOPLASM OF UNSPECIFIED SITE OF LEFT FEMALE BREAST (HCC): ICD-10-CM

## 2024-08-22 DIAGNOSIS — Z17.0 ESTROGEN RECEPTOR POSITIVE STATUS (ER+): ICD-10-CM

## 2024-08-22 PROCEDURE — 88307 TISSUE EXAM BY PATHOLOGIST: CPT | Performed by: SURGERY

## 2024-11-05 ENCOUNTER — HOSPITAL ENCOUNTER (EMERGENCY)
Age: 74
Discharge: HOME | End: 2024-11-05
Payer: MEDICARE

## 2024-11-05 ENCOUNTER — APPOINTMENT (OUTPATIENT)
Dept: GENERAL RADIOLOGY | Age: 74
End: 2024-11-05
Attending: NURSE PRACTITIONER
Payer: MEDICARE

## 2024-11-05 VITALS
TEMPERATURE: 98 F | RESPIRATION RATE: 18 BRPM | HEIGHT: 63 IN | OXYGEN SATURATION: 96 % | DIASTOLIC BLOOD PRESSURE: 86 MMHG | SYSTOLIC BLOOD PRESSURE: 157 MMHG | BODY MASS INDEX: 32.43 KG/M2 | WEIGHT: 183 LBS | HEART RATE: 80 BPM

## 2024-11-05 DIAGNOSIS — M79.18 MYOFASCIAL PAIN: Primary | ICD-10-CM

## 2024-11-05 PROBLEM — F33.41 MAJOR DEPRESSIVE DISORDER, RECURRENT, IN PARTIAL REMISSION (HCC): Status: ACTIVE | Noted: 2022-04-27

## 2024-11-05 PROBLEM — E21.3 HYPERPARATHYROIDISM (HCC): Status: ACTIVE | Noted: 2023-06-23

## 2024-11-05 PROCEDURE — 99283 EMERGENCY DEPT VISIT LOW MDM: CPT

## 2024-11-05 PROCEDURE — 73630 X-RAY EXAM OF FOOT: CPT | Performed by: NURSE PRACTITIONER

## 2024-11-05 NOTE — ED PROVIDER NOTES
History     Chief Complaint   Patient presents with    Foot Pain       Subjective:   HPI    Carmentelma Villa, 74 year old female with notable medical history of HTN, HLD, chronic back pain, CKD, fibromyalgia, osteoporosis who presents with foot pain.  Patient reports atraumatic pain to Left medial arch region for the past 2-days. Patient reports taking prescription pain medication she had for a lumpectomy. Denies falls, trauma, known injury, or Hx similar pain.         Objective:   Past Medical History:    Breast cancer (HCC)    Cancer (HCC)    Insomnia    Other and unspecified hyperlipidemia    Rheumatoid arthritis (HCC)    Unspecified essential hypertension              Past Surgical History:   Procedure Laterality Date    Colonoscopy N/A 2017    Procedure: COLONOSCOPY, POSSIBLE BIOPSY, POSSIBLE POLYPECTOMY 27850;  Surgeon: Jaden Aguilar MD;  Location: [facility], [facility]    Lumpectomy left Left           x 2    Other surgical history      oral surgery                Social History     Socioeconomic History    Marital status:     Number of children: 2   Occupational History    Occupation: [employer]   Tobacco Use    Smoking status: Former     Current packs/day: 0.00     Types: Cigarettes     Start date: 1970     Quit date: 2001     Years since quittin.8    Smokeless tobacco: Never   Vaping Use    Vaping status: Never Used   Substance and Sexual Activity    Alcohol use: Yes     Alcohol/week: 0.0 standard drinks of alcohol     Comment: social rare    Drug use: No    Sexual activity: Yes     Partners: Male   Other Topics Concern    Seat Belt Yes              Medications Ordered Prior to Encounter[1]      Review of Systems   Musculoskeletal:         Left foot pain   All other systems reviewed and are negative.        Constitutional and vital signs reviewed.      All other systems reviewed and negative except as noted above.    I have reviewed the family history, social history,  allergies, and outpatient medications.     History reviewed from EMR: Encounters, problem list, allergies, medications      Physical Exam     ED Triage Vitals [11/05/24 0845]   /86   Pulse 80   Resp 18   Temp 98.4 °F (36.9 °C)   Temp src Temporal   SpO2 96 %   O2 Device None (Room air)       Current:/86   Pulse 80   Temp 98.4 °F (36.9 °C) (Temporal)   Resp 18   Ht 160 cm (5' 3\")   Wt 83 kg   SpO2 96%   BMI 32.42 kg/m²       Physical Exam  Vitals and nursing note reviewed.   Constitutional:       General: She is not in acute distress.     Appearance: Normal appearance. She is normal weight. She is not ill-appearing or toxic-appearing.   HENT:      Head: Normocephalic and atraumatic.      Right Ear: External ear normal.      Left Ear: External ear normal.      Nose: Nose normal.      Mouth/Throat:      Mouth: Mucous membranes are moist.   Eyes:      Extraocular Movements: Extraocular movements intact.      Conjunctiva/sclera: Conjunctivae normal.      Pupils: Pupils are equal, round, and reactive to light.   Cardiovascular:      Rate and Rhythm: Normal rate.      Pulses: Normal pulses.   Pulmonary:      Effort: Pulmonary effort is normal. No respiratory distress.   Musculoskeletal:         General: No swelling, tenderness or signs of injury. Normal range of motion.      Cervical back: Normal range of motion and neck supple.        Feet:    Feet:      Comments: Tenderness to Left medial arch region. No signs of acute injury. No plantar aspect pain.  Skin:     General: Skin is warm and dry.      Capillary Refill: Capillary refill takes less than 2 seconds.      Coloration: Skin is not jaundiced.   Neurological:      General: No focal deficit present.      Mental Status: She is alert and oriented to person, place, and time. Mental status is at baseline.   Psychiatric:         Mood and Affect: Mood normal.         Behavior: Behavior normal.         Thought Content: Thought content normal.          Judgment: Judgment normal.            ED Course     Labs Reviewed - No data to display  XR FOOT, COMPLETE (MIN 3 VIEWS), LEFT (CPT=73630)   Final Result   PROCEDURE:  XR FOOT, COMPLETE (MIN 3 VIEWS), LEFT (CPT=73630)       TECHNIQUE:  AP, oblique, and lateral views were obtained.       COMPARISON:  None.       INDICATIONS:  atraumatic pain near medial arch       PATIENT STATED HISTORY: (As transcribed by Technologist)  Patient states    they have left medial foot pain for 1 day. Patient states that she has    some swelling. Patient has a history of osteoporosis and rheumatoid    arthritis. Patient denies any injuries to    the left foot.            FINDINGS:     No acute fracture or dislocation.  There is moderate to severe    osteoarthritis at the 1st MTP joint with joint space narrowing and    osteophyte formation.  Mild scattered degenerative changes within the    interphalangeal joints.  No radiopaque foreign body.     There is calcaneal plantar and Achilles enthesophytes.                           =====   CONCLUSION:  See above           LOCATION:  [facility]           Dictated by (CST): Woody Ruiz MD on 11/05/2024 at 9:17 AM        Finalized by (CST): Woody Ruiz MD on 11/05/2024 at 9:18 AM             Vitals:    11/05/24 0845   BP: 157/86   Pulse: 80   Resp: 18   Temp: 98.4 °F (36.9 °C)   TempSrc: Temporal   SpO2: 96%   Weight: 83 kg   Height: 160 cm (5' 3\")            [facility]        Carmen Villa, 74 year old female with medical history as noted above who presents with Left foot pain   - Patient in NAD, VSS   - myofascial vs osteophyte vs strain vs other   - +notable adhesions with gently pressure scraping to site   - Xray ordered        ** See ED course below for additional information on care provided / interventions / notable events throughout patient's encounter.    ED Course as of 11/05/24 0925  ------------------------------------------------------------  Time: 11/05 0916  Comment: Self read of  imaging w/o obvious acute osseous process. Awaiting official read.    ------------------------------------------------------------  Time: 11/05 0925  Comment: Radiology noting no acute process, but OA and osteophytes (not to area of tenderness)  Supportive care discussed  Podiatry contact provided        ** I have independently reviewed the radiology images, clinical lab results, and ECG tracings as described above (if applicable)    ** Concerning co-morbidities possibly affecting complaint / care: osteoporosis, fibromyalgia    ** See below for home care instructions (if applicable)          Medical Decision Making  Amount and/or Complexity of Data Reviewed  Radiology: ordered and independent interpretation performed. Decision-making details documented in ED Course.    Risk  OTC drugs.        Disposition and Plan     Clinical Impression:  1. Myofascial pain         Disposition:  Discharge  11/5/2024  9:24 am    Follow-up:  Beatrice Dupree DPM  [address] [place] [address]  [phone]    Follow up  Podiatry contact          Medications Prescribed:  Current Discharge Medication List          The above patient (and/or guardian) was made aware that an appropriate evaluation has been performed, and that no additional testing is required at this time. In my medical judgment, there is currently no evidence of an immediate life-threatening or surgical condition, therefore discharge is indicated at this time. The patient (and/or guardian) was advised that a small risk still exists that a serious condition could develop. The patient was instructed to arrange close follow-up with their primary care provider (or the referral provider given today). The patient received written and verbal instructions regarding their condition / concerns, demonstrated understanding, and is agreement with the outpatient treatment plan.        Home care instructions:    Supportive Care Measures:   - Tylenol as needed for  pain   - Ice as tolerated for pain / swelling   - You may benefit from over-the-counter topical pain medication such as: Voltaren (Diclofenac), Icy/Hot, Biofreeze, Bengay, Lidocaine, etc.   - Avoid excessive standing, walking, pressure on foot until symptoms resolve   - You may benefit from Epsom salt soaks in warm water throughout the day - 20min at a time   - You may benefit from massage to the area a couple times a day   - Follow up with your doctor (or orthopedic specialist) as needed       Jaden Morrow, DNP, APRN, AGACNP-BC, FNP-C, CNL  Adult-Gerontology Acute Care & Family Nurse Practitioner  [facility]                 [1]   No current facility-administered medications on file prior to encounter.     Current Outpatient Medications on File Prior to Encounter   Medication Sig Dispense Refill    TRAZODONE 50 MG Oral Tab TAKE 2 TABLETS BY MOUTH NIGHTLY **PATIENT MUST BE SEEN FOR ANY FURTHER REFILLS** 180 tablet 0    CELECOXIB 200 MG Oral Cap TAKE 1 CAPSULE BY MOUTH ONCE DAILY AS NEEDED WITH FOOD 90 capsule 0    IRBESARTAN 150 MG Oral Tab Take 1 tablet by mouth once daily 180 tablet 0    ATORVASTATIN 20 MG Oral Tab Take 1 tablet by mouth once daily 90 tablet 0    METHOTREXATE 2.5 MG Oral Tab TAKE 8 TABLETS BY MOUTH ONCE A WEEK 32 tablet 3    LEUCOVORIN CALCIUM 10 MG Oral Tab TAKE 1 TABLET BY MOUTH ONCE DAILY AFTER METHOTREXATE ONLY** 12 tablet 0    magnesium oxide 400 MG Oral Tab Take 1 tablet (400 mg total) by mouth daily.      Probiotic Product (PROBIOTIC-10) Oral Chew Tab Chew 1 tablet by mouth daily.      aspirin 81 MG Oral Tab EC Take 1 tablet (81 mg total) by mouth daily.      folic acid 1 MG Oral Tab Take 3 tablets (3 mg total) by mouth daily. 270 tablet 0    HYDROCHLOROTHIAZIDE 12.5 MG Oral Tab Take 1 tablet by mouth once daily 90 tablet 0    hydrocortisone 2.5 % External Cream Apply topically to face BID x 2 weeks, then stop. Resume if needed. 30 g 2    Cholecalciferol 125 MCG (5000 UT)  Oral Tab Take 1 tablet by mouth daily.      Calcium Carbonate-Vitamin D (CALCIUM 600 + D OR) Take 1 tablet by mouth daily.      Zinc 50 MG Oral Tab Take 1 tablet by mouth daily.      Glucosamine-Chondroitin (GLUCOSAMINE CHONDR COMPLEX OR) Take 1 tablet by mouth daily.      Vitamin E 180 MG Oral Cap Take 1 capsule by mouth daily.      Omega-3 Fatty Acids (FISH OIL ADULT GUMMIES OR) Take 1 tablet by mouth daily.      Turmeric 500 MG Oral Cap Take 500 mg by mouth daily.      Ascorbic Acid (VITAMIN C) 1000 MG Oral Tab Take 1,000 mg by mouth daily.      Cinnamon 500 MG Oral Tab Take 1,000 mg by mouth daily.

## 2024-11-05 NOTE — DISCHARGE INSTRUCTIONS
Supportive Care Measures:   - Tylenol as needed for pain   - Ice as tolerated for pain / swelling   - You may benefit from over-the-counter topical pain medication such as: Voltaren (Diclofenac), Icy/Hot, Biofreeze, Bengay, Lidocaine, etc.   - Avoid excessive standing, walking, pressure on foot until symptoms resolve   - You may benefit from Epsom salt soaks in warm water throughout the day - 20min at a time   - You may benefit from massage to the area a couple times a day   - Follow up with your doctor (or orthopedic specialist) as needed

## (undated) NOTE — ED AVS SNAPSHOT
THE [facility] Emergency Department in [address] [facility]    Phone:  [phone]    Fax:  [phone]   MRN: [id number]    Department:  THE [facility] Emergency Department in [place]   Date of Visit Quantity:  20 tablet   Commonly known as:  ZOFRAN-ODT   Take 1 tablet (4 mg total) by mouth every 4 (four) hours as needed for Nausea.             Where to Get Your Medications      You can get these medications from any pharmacy     Bring a paper prescript to a primary care or a specialist physician for a follow-up visit, please tell this physician (or your personal doctor if your instructions are to return to your personal doctor) about any new or lasting problems.  The primary care or specialist physician w We are concerned for your overall well being:    - If you are a smoker or have smoked in the last 12 months, we encourage you to explore options for quitting.     - If you have concerns related to behavioral health issues or thoughts of harming yourself, There is no sign of acute territorial infarction. There is no hemorrhage or mass lesion. SINUSES:           No sign of acute sinusitis. MASTOIDS:          No sign of acute inflammation.      SKULL:             No evidence for fracture or osseou

## (undated) NOTE — ED AVS SNAPSHOT
THE [facility] Emergency Department in [address]  Phone:  [phone]  Fax:  Lisbeth Ratliff   MRN: [id number]    Department:  THE [facility] Emergency Department in [place]   Date of Visit:  7/8/201 IF THERE IS ANY CHANGE OR WORSENING OF YOUR CONDITION, CALL YOUR PRIMARY CARE PHYSICIAN AT ONCE OR RETURN IMMEDIATELY TO THE EMERGENCY DEPARTMENT.     If you have been prescribed any medication(s), please fill your prescription right away and begin taking t

## (undated) NOTE — ED AVS SNAPSHOT
[facility] in [address] [facility]    Phone:  [phone]    Fax:  [address]   MRN: [id number]    Department:  [facility] in [place]   Date of Visit IF THERE IS ANY CHANGE OR WORSENING OF YOUR CONDITION, CALL YOUR PRIMARY CARE PHYSICIAN AT ONCE OR RETURN IMMEDIATELY TO THE EMERGENCY DEPARTMENT.     If you have been prescribed any medication(s), please fill your prescription right away and begin taking t